# Patient Record
Sex: FEMALE | Race: OTHER | NOT HISPANIC OR LATINO | URBAN - METROPOLITAN AREA
[De-identification: names, ages, dates, MRNs, and addresses within clinical notes are randomized per-mention and may not be internally consistent; named-entity substitution may affect disease eponyms.]

---

## 2022-02-28 ENCOUNTER — TELEPHONE (OUTPATIENT)
Dept: OBSTETRICS AND GYNECOLOGY | Facility: CLINIC | Age: 27
End: 2022-02-28
Payer: COMMERCIAL

## 2022-03-02 ENCOUNTER — OFFICE VISIT (OUTPATIENT)
Dept: OBSTETRICS AND GYNECOLOGY | Facility: CLINIC | Age: 27
End: 2022-03-02
Payer: COMMERCIAL

## 2022-03-02 VITALS
SYSTOLIC BLOOD PRESSURE: 114 MMHG | HEIGHT: 62 IN | DIASTOLIC BLOOD PRESSURE: 62 MMHG | BODY MASS INDEX: 23.13 KG/M2 | WEIGHT: 125.69 LBS

## 2022-03-02 DIAGNOSIS — Z30.9 ENCOUNTER FOR CONTRACEPTIVE MANAGEMENT, UNSPECIFIED TYPE: Primary | ICD-10-CM

## 2022-03-02 LAB
B-HCG UR QL: NEGATIVE
CTP QC/QA: YES

## 2022-03-02 PROCEDURE — 99999 PR PBB SHADOW E&M-EST. PATIENT-LVL II: ICD-10-PCS | Mod: PBBFAC,,, | Performed by: OBSTETRICS & GYNECOLOGY

## 2022-03-02 PROCEDURE — 81025 URINE PREGNANCY TEST: CPT | Mod: S$GLB,,, | Performed by: OBSTETRICS & GYNECOLOGY

## 2022-03-02 PROCEDURE — 99202 PR OFFICE/OUTPT VISIT, NEW, LEVL II, 15-29 MIN: ICD-10-PCS | Mod: S$GLB,,, | Performed by: OBSTETRICS & GYNECOLOGY

## 2022-03-02 PROCEDURE — 81025 POCT URINE PREGNANCY: ICD-10-PCS | Mod: S$GLB,,, | Performed by: OBSTETRICS & GYNECOLOGY

## 2022-03-02 PROCEDURE — 99999 PR PBB SHADOW E&M-EST. PATIENT-LVL II: CPT | Mod: PBBFAC,,, | Performed by: OBSTETRICS & GYNECOLOGY

## 2022-03-02 PROCEDURE — 99202 OFFICE O/P NEW SF 15 MIN: CPT | Mod: S$GLB,,, | Performed by: OBSTETRICS & GYNECOLOGY

## 2022-03-02 NOTE — PROGRESS NOTES
Subjective:       Patient ID: Ifrah Crespo is a 26 y.o. female.    Chief Complaint:  Consult (IUD)    History of Present Illness  HPI  27 y/o  presents for contraception management. She has a history of PCOS (irregular menses and polycystic ovaries). She is sexually active currently. She was on OCPs in the past but had worsening acne and depression. She has some acne. She had the Paragard IUD in 2021 but had pelvic pain and IUD expulsed into the cervix and it was removed in 2021. Currently using condoms for contraception. Cycles are irregular, occurring only every 3 months. LMP 1/10/22.    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     GYN & OB History  Patient's last menstrual period was 01/10/2022.   Date of Last Pap: No result found    OB History    Para Term  AB Living   0 0 0 0 0 0   SAB IAB Ectopic Multiple Live Births   0 0 0 0 0       Review of Systems  Review of Systems   Constitutional: Negative for chills, diaphoresis, fatigue and fever.   Respiratory: Negative for cough and shortness of breath.    Cardiovascular: Negative for chest pain and palpitations.   Gastrointestinal: Negative for abdominal pain, constipation, diarrhea, nausea and vomiting.   Genitourinary: Positive for menstrual problem. Negative for dysmenorrhea, dyspareunia, dysuria, menorrhagia, pelvic pain, vaginal bleeding, vaginal discharge and vaginal pain.   Integumentary:  Positive for acne.   Neurological: Negative for headaches.   Psychiatric/Behavioral: Negative for depression. The patient is not nervous/anxious.            Objective:    Physical Exam:   Constitutional: She is oriented to person, place, and time. She appears well-developed and well-nourished. No distress.    HENT:   Head: Normocephalic and atraumatic.       Pulmonary/Chest: Effort normal.          Genitourinary:    Genitourinary Comments: Talk only             Musculoskeletal: Normal range of motion and moves all extremeties.        Neurological: She is alert and oriented to person, place, and time.     Psychiatric: She has a normal mood and affect. Her behavior is normal. Judgment and thought content normal.          Assessment:        1. Encounter for contraceptive management, unspecified type             Plan:      Ifrah was seen today for consult.    Diagnoses and all orders for this visit:    Encounter for contraceptive management, unspecified type  Contraception options discussed  Darlene pace sent    15 minutes of total time spent on the encounter, which includes face to face time and non-face to face time preparing to see the patient (eg, review of tests), Obtaining and/or reviewing separately obtained history, Documenting clinical information in the electronic or other health record, Independently interpreting results (not separately reported) and communicating results to the patient/family/caregiver, or Care coordination (not separately reported).      Orders Placed This Encounter   Procedures    Device Authorization Order    POCT urine pregnancy       No follow-ups on file.

## 2022-03-21 ENCOUNTER — PROCEDURE VISIT (OUTPATIENT)
Dept: OBSTETRICS AND GYNECOLOGY | Facility: CLINIC | Age: 27
End: 2022-03-21
Payer: COMMERCIAL

## 2022-03-21 VITALS
SYSTOLIC BLOOD PRESSURE: 106 MMHG | DIASTOLIC BLOOD PRESSURE: 60 MMHG | HEIGHT: 62 IN | WEIGHT: 124.75 LBS | BODY MASS INDEX: 22.96 KG/M2

## 2022-03-21 DIAGNOSIS — Z30.430 ENCOUNTER FOR IUD INSERTION: Primary | ICD-10-CM

## 2022-03-21 LAB
B-HCG UR QL: NEGATIVE
CTP QC/QA: YES

## 2022-03-21 PROCEDURE — 58300 INSERT INTRAUTERINE DEVICE: CPT | Mod: S$GLB,,, | Performed by: OBSTETRICS & GYNECOLOGY

## 2022-03-21 PROCEDURE — 99499 NO LOS: ICD-10-PCS | Mod: S$GLB,,, | Performed by: OBSTETRICS & GYNECOLOGY

## 2022-03-21 PROCEDURE — 81025 URINE PREGNANCY TEST: CPT | Mod: S$GLB,,, | Performed by: OBSTETRICS & GYNECOLOGY

## 2022-03-21 PROCEDURE — 99499 UNLISTED E&M SERVICE: CPT | Mod: S$GLB,,, | Performed by: OBSTETRICS & GYNECOLOGY

## 2022-03-21 PROCEDURE — 81025 POCT URINE PREGNANCY: ICD-10-PCS | Mod: S$GLB,,, | Performed by: OBSTETRICS & GYNECOLOGY

## 2022-03-21 PROCEDURE — 58300 INSERTION OF IUD: ICD-10-PCS | Mod: S$GLB,,, | Performed by: OBSTETRICS & GYNECOLOGY

## 2022-03-24 NOTE — PROCEDURES
Insertion of IUD    Date/Time: 3/21/2022 1:30 PM  Performed by: Valeria Colmenares MD  Authorized by: Valeria Colmenares MD     Consent:     Consent obtained:  Written    Consent given by:  Patient    Procedure risks and benefits discussed: yes      Patient questions answered: yes      Patient agrees, verbalizes understanding, and wants to proceed: yes      Educational handouts given: yes      Instructions and paperwork completed: yes    Procedure:     Pelvic exam performed: yes      Negative GC/chlamydia test: no      Negative urine pregnancy test: yes      Negative serum pregnancy test: no      Cervix cleaned and prepped: yes      Speculum placed in vagina: yes      Tenaculum applied to cervix: yes      Uterus sounded: yes      Uterus sound depth (cm):  7    IUD inserted with no complications: yes      IUD type:  ParaGard    Strings trimmed: yes    380 mm copper intrauterine device 380 square mm       Post-procedure:     Patient tolerated procedure well: yes      Patient will follow up after next period: yes

## 2022-04-18 ENCOUNTER — TELEPHONE (OUTPATIENT)
Dept: OBSTETRICS AND GYNECOLOGY | Facility: CLINIC | Age: 27
End: 2022-04-18
Payer: MEDICAID

## 2022-04-18 NOTE — TELEPHONE ENCOUNTER
----- Message from Stephy Pal sent at 4/18/2022  9:47 AM CDT -----  Pt is calling in regards to an appt for iud discomfort  I offered pt next available of 5/2 and pt refused offered [facility] and pt doesn't have transportation  Pt can only go to [facility] location    Pt can be contacted at [phone]    Called pt in regards to message above. Pt states she has been experiencing lower back pain. I offered pt next available. Pt verbalized understanding

## 2022-04-20 ENCOUNTER — OFFICE VISIT (OUTPATIENT)
Dept: OBSTETRICS AND GYNECOLOGY | Facility: CLINIC | Age: 27
End: 2022-04-20
Payer: COMMERCIAL

## 2022-04-20 VITALS
WEIGHT: 126.31 LBS | BODY MASS INDEX: 23.24 KG/M2 | HEIGHT: 62 IN | SYSTOLIC BLOOD PRESSURE: 112 MMHG | DIASTOLIC BLOOD PRESSURE: 64 MMHG

## 2022-04-20 DIAGNOSIS — N89.8 VAGINAL IRRITATION: ICD-10-CM

## 2022-04-20 DIAGNOSIS — R10.2 PELVIC PAIN: ICD-10-CM

## 2022-04-20 DIAGNOSIS — Z30.431 IUD CHECK UP: Primary | ICD-10-CM

## 2022-04-20 LAB
B-HCG UR QL: NEGATIVE
CTP QC/QA: YES

## 2022-04-20 PROCEDURE — 87481 CANDIDA DNA AMP PROBE: CPT | Mod: 59 | Performed by: OBSTETRICS & GYNECOLOGY

## 2022-04-20 PROCEDURE — 99999 PR PBB SHADOW E&M-EST. PATIENT-LVL II: ICD-10-PCS | Mod: PBBFAC,,, | Performed by: OBSTETRICS & GYNECOLOGY

## 2022-04-20 PROCEDURE — 81025 URINE PREGNANCY TEST: CPT | Mod: S$GLB,,, | Performed by: OBSTETRICS & GYNECOLOGY

## 2022-04-20 PROCEDURE — 81025 POCT URINE PREGNANCY: ICD-10-PCS | Mod: S$GLB,,, | Performed by: OBSTETRICS & GYNECOLOGY

## 2022-04-20 PROCEDURE — 99999 PR PBB SHADOW E&M-EST. PATIENT-LVL II: CPT | Mod: PBBFAC,,, | Performed by: OBSTETRICS & GYNECOLOGY

## 2022-04-20 PROCEDURE — 99213 OFFICE O/P EST LOW 20 MIN: CPT | Mod: S$GLB,,, | Performed by: OBSTETRICS & GYNECOLOGY

## 2022-04-20 PROCEDURE — 99213 PR OFFICE/OUTPT VISIT, EST, LEVL III, 20-29 MIN: ICD-10-PCS | Mod: S$GLB,,, | Performed by: OBSTETRICS & GYNECOLOGY

## 2022-04-20 PROCEDURE — 87801 DETECT AGNT MULT DNA AMPLI: CPT | Performed by: OBSTETRICS & GYNECOLOGY

## 2022-04-20 NOTE — PROGRESS NOTES
Subjective:       Patient ID: Ifrah Crespo is a 26 y.o. female.    Chief Complaint:  IUD Check    History of Present Illness  HPI  27 y/o  presents for follow up s/p Paragard IUD placement 4 weeks ago. Reports some pelvic/back pain and vaginal irritation.    GYN & OB History  No LMP recorded (lmp unknown).   Date of Last Pap: No result found    OB History    Para Term  AB Living   0 0 0 0 0 0   SAB IAB Ectopic Multiple Live Births   0 0 0 0 0       Review of Systems  Review of Systems   Constitutional: Negative for chills, diaphoresis, fatigue and fever.   Respiratory: Negative for cough and shortness of breath.    Cardiovascular: Negative for chest pain and palpitations.   Gastrointestinal: Negative for abdominal pain, constipation, diarrhea, nausea and vomiting.   Genitourinary: Positive for pelvic pain and vaginal discharge. Negative for dysmenorrhea, dyspareunia, dysuria, frequency, menstrual problem, vaginal bleeding and vaginal pain.   Neurological: Negative for headaches.   Psychiatric/Behavioral: Negative for depression. The patient is not nervous/anxious.            Objective:    Physical Exam:   Constitutional: She is oriented to person, place, and time. She appears well-developed and well-nourished. No distress.    HENT:   Head: Normocephalic and atraumatic.    Eyes: EOM are normal.      Pulmonary/Chest: Effort normal.          Genitourinary:    Genitourinary Comments: Normal external female genitalia; vagina rugated, normal; cervix normal, no masses, IUD strings in place.              Musculoskeletal: Normal range of motion and moves all extremeties.       Neurological: She is alert and oriented to person, place, and time.    Skin: No rash noted.    Psychiatric: She has a normal mood and affect. Her behavior is normal. Judgment and thought content normal.          Assessment:        1. IUD check up    2. Pelvic pain    3. Vaginal irritation             Plan:      Ifrah was seen  today for iud check.    Diagnoses and all orders for this visit:    IUD check up    Pelvic pain  -     POCT urine pregnancy    Vaginal irritation  -     Vaginosis Screen by DNA Probe    IUD strings in place  UPT neg  Affirm collected  Precautions    Orders Placed This Encounter   Procedures    Vaginosis Screen by DNA Probe    POCT urine pregnancy       Follow up if symptoms worsen or fail to improve.

## 2022-04-22 LAB
BACTERIAL VAGINOSIS DNA: NEGATIVE
CANDIDA GLABRATA DNA: NEGATIVE
CANDIDA KRUSEI DNA: NEGATIVE
CANDIDA RRNA VAG QL PROBE: NEGATIVE
T VAGINALIS RRNA GENITAL QL PROBE: NEGATIVE

## 2022-04-27 ENCOUNTER — HOSPITAL ENCOUNTER (OUTPATIENT)
Dept: RADIOLOGY | Facility: OTHER | Age: 27
Discharge: HOME | End: 2022-04-27
Attending: OBSTETRICS & GYNECOLOGY
Payer: COMMERCIAL

## 2022-04-27 ENCOUNTER — TELEPHONE (OUTPATIENT)
Dept: OBSTETRICS AND GYNECOLOGY | Facility: CLINIC | Age: 27
End: 2022-04-27
Payer: MEDICAID

## 2022-04-27 DIAGNOSIS — R10.2 PELVIC PAIN: ICD-10-CM

## 2022-04-27 DIAGNOSIS — Z30.431 IUD CHECK UP: ICD-10-CM

## 2022-04-27 DIAGNOSIS — R10.2 PELVIC PAIN: Primary | ICD-10-CM

## 2022-04-27 PROCEDURE — 76856 US EXAM PELVIC COMPLETE: CPT | Mod: 26,,, | Performed by: RADIOLOGY

## 2022-04-27 PROCEDURE — 76830 TRANSVAGINAL US NON-OB: CPT | Mod: TC

## 2022-04-27 PROCEDURE — 76830 TRANSVAGINAL US NON-OB: CPT | Mod: 26,,, | Performed by: RADIOLOGY

## 2022-04-27 PROCEDURE — 76856 US PELVIS COMP WITH TRANSVAG NON-OB (XPD): ICD-10-PCS | Mod: 26,,, | Performed by: RADIOLOGY

## 2022-04-27 PROCEDURE — 76830 US PELVIS COMP WITH TRANSVAG NON-OB (XPD): ICD-10-PCS | Mod: 26,,, | Performed by: RADIOLOGY

## 2022-04-27 NOTE — TELEPHONE ENCOUNTER
----- Message from Dax Vivar MA sent at 4/27/2022 10:20 AM CDT -----  Can you put in order in for an ultrasound. Pt states her pain has worsened  over the last two days. She was just seen on 04/20 for an iud check.  ----- Message -----  From: Stephy Pal  Sent: 4/27/2022  10:05 AM CDT  To: Darrian [facility] Staff    Pt is calling to speak with provider for results from labs and also to talk about the pain she is having.  Pt believes pain is urgent and would like to discuss her concerns with provider  I did schedule the soonest available appt for provider this Friday   Pt can be contacted at [phone]

## 2022-04-27 NOTE — TELEPHONE ENCOUNTER
----- Message from Stephy Pal sent at 4/27/2022 10:03 AM CDT -----  Pt is calling to speak with provider for results from labs and also to talk about the pain she is having.  Pt believes pain is urgent and would like to discuss her concerns with provider  I did schedule the soonest available appt for provider this Friday   Pt can be contacted at [phone]    Called pt in regards to message above. I told pt I will talk to Dr. Colmenares to see if she can order an ultrasound. Pt verbalized understanding

## 2022-04-29 ENCOUNTER — PATIENT MESSAGE (OUTPATIENT)
Dept: OBSTETRICS AND GYNECOLOGY | Facility: CLINIC | Age: 27
End: 2022-04-29
Payer: MEDICAID

## 2022-05-25 ENCOUNTER — OFFICE VISIT (OUTPATIENT)
Dept: OBSTETRICS AND GYNECOLOGY | Facility: CLINIC | Age: 27
End: 2022-05-25
Payer: COMMERCIAL

## 2022-05-25 VITALS
SYSTOLIC BLOOD PRESSURE: 108 MMHG | HEIGHT: 62 IN | WEIGHT: 124.31 LBS | DIASTOLIC BLOOD PRESSURE: 54 MMHG | BODY MASS INDEX: 22.88 KG/M2

## 2022-05-25 DIAGNOSIS — Z97.5 IUD CONTRACEPTION: Primary | ICD-10-CM

## 2022-05-25 PROCEDURE — 99213 OFFICE O/P EST LOW 20 MIN: CPT | Mod: PBBFAC | Performed by: OBSTETRICS & GYNECOLOGY

## 2022-05-25 PROCEDURE — 99999 PR PBB SHADOW E&M-EST. PATIENT-LVL III: CPT | Mod: PBBFAC,,, | Performed by: OBSTETRICS & GYNECOLOGY

## 2022-05-25 PROCEDURE — 99213 OFFICE O/P EST LOW 20 MIN: CPT | Mod: S$GLB,,, | Performed by: OBSTETRICS & GYNECOLOGY

## 2022-05-25 PROCEDURE — 99213 PR OFFICE/OUTPT VISIT, EST, LEVL III, 20-29 MIN: ICD-10-PCS | Mod: S$GLB,,, | Performed by: OBSTETRICS & GYNECOLOGY

## 2022-05-25 PROCEDURE — 99999 PR PBB SHADOW E&M-EST. PATIENT-LVL III: ICD-10-PCS | Mod: PBBFAC,,, | Performed by: OBSTETRICS & GYNECOLOGY

## 2022-05-25 RX ORDER — LACTIC ACID, L-, CITRIC ACID MONOHYDRATE, AND POTASSIUM BITARTRATE 90; 50; 20 MG/5G; MG/5G; MG/5G
1 GEL VAGINAL
Qty: 180 G | Refills: 11 | Status: SHIPPED | OUTPATIENT
Start: 2022-05-25 | End: 2022-06-07 | Stop reason: SDUPTHER

## 2022-05-25 NOTE — PROGRESS NOTES
Subjective:       Patient ID: Ifrah Crespo is a 27 y.o. female.    Chief Complaint:  IUD Check      History of Present Illness  HPI  26 y/o  presents for contraception management. She had Paragard IUD placed 2 months ago. She had some pelvic pain and so pelvic US was done which showed that the IUD was slightly low-lying. Reports pain now resolved.     GYN & OB History  No LMP recorded. Patient has had an implant.   Date of Last Pap: No result found    OB History    Para Term  AB Living   0 0 0 0 0 0   SAB IAB Ectopic Multiple Live Births   0 0 0 0 0       Review of Systems  Review of Systems   Constitutional: Negative for chills, diaphoresis, fatigue and fever.   Respiratory: Negative for cough and shortness of breath.    Cardiovascular: Negative for chest pain and palpitations.   Gastrointestinal: Negative for abdominal pain, constipation, diarrhea, nausea and vomiting.   Genitourinary: Negative for dysmenorrhea, dyspareunia, dysuria, frequency, menorrhagia, menstrual problem, pelvic pain, vaginal bleeding, vaginal discharge and vaginal pain.   Neurological: Negative for headaches.   Psychiatric/Behavioral: Negative for depression. The patient is not nervous/anxious.            Objective:    Physical Exam:   Constitutional: She is oriented to person, place, and time. She appears well-developed and well-nourished. No distress.    HENT:   Head: Normocephalic and atraumatic.    Eyes: EOM are normal.      Pulmonary/Chest: Effort normal.        Abdominal: Soft. She exhibits no distension and no mass. There is no abdominal tenderness. There is no rebound. No hernia.     Genitourinary:    Genitourinary Comments: Normal external female genitalia; vagina rugated, normal; cervix normal, no masses, IUD strings in place.              Musculoskeletal: Normal range of motion and moves all extremeties.       Neurological: She is alert and oriented to person, place, and time.     Psychiatric: She has a  normal mood and affect. Her behavior is normal. Judgment and thought content normal.          Assessment:        1. IUD contraception             Plan:      Ifrah was seen today for iud check.    Diagnoses and all orders for this visit:    IUD contraception    Other orders  -     lactic acid-citric-potassium (PHEXXI) 1.8-1-0.4 % Gel; Place 1 applicator vaginally as needed (contraception).    Counseled patient that given slightly low lying IUD, cannot say how effective the IUD is as far as contraception. Counseled on increased risk of abnormal pregnancy if she were to conceive with IUD in place. Ultimately the IUD in this position is still likely better than no conctraception, and we discussed that it would be reasonable to use a backup contraception like condoms/phexxi and leave the IUD in place.  Rx Phexxi sent.  Precautions.     20 minutes of total time spent on the encounter, which includes face to face time and non-face to face time preparing to see the patient (eg, review of tests), Obtaining and/or reviewing separately obtained history, Documenting clinical information in the electronic or other health record, Independently interpreting results (not separately reported) and communicating results to the patient/family/caregiver, or Care coordination (not separately reported).      No orders of the defined types were placed in this encounter.      No follow-ups on file.

## 2022-05-25 NOTE — PROGRESS NOTES
Subjective:       Patient ID: Ifrah Crespo is a 26 y.o. female.    Chief Complaint:  IUD Check      History of Present Illness  HPI  {OBG HPI BLOCKS:41101}   Denies pelvic pain and vaginal discharge.       GYN & OB History  No LMP recorded. Patient has had an implant.   Date of Last Pap: No result found    OB History    Para Term  AB Living   0 0 0 0 0 0   SAB IAB Ectopic Multiple Live Births   0 0 0 0 0       Review of Systems  Review of Systems        Objective:    Physical Exam       Assessment:      No diagnosis found.   ***         Plan:      ***

## 2022-06-07 ENCOUNTER — PATIENT MESSAGE (OUTPATIENT)
Dept: OBSTETRICS AND GYNECOLOGY | Facility: CLINIC | Age: 27
End: 2022-06-07
Payer: MEDICAID

## 2022-06-07 RX ORDER — LACTIC ACID, L-, CITRIC ACID MONOHYDRATE, AND POTASSIUM BITARTRATE 90; 50; 20 MG/5G; MG/5G; MG/5G
1 GEL VAGINAL
Qty: 120 G | Refills: 11 | Status: SHIPPED | OUTPATIENT
Start: 2022-06-07

## 2022-06-27 ENCOUNTER — OFFICE VISIT (OUTPATIENT)
Dept: OBSTETRICS AND GYNECOLOGY | Facility: CLINIC | Age: 27
End: 2022-06-27
Payer: MEDICAID

## 2022-06-27 ENCOUNTER — DOCUMENTATION ONLY (OUTPATIENT)
Dept: PHARMACY | Facility: CLINIC | Age: 27
End: 2022-06-27
Payer: MEDICAID

## 2022-06-27 VITALS
BODY MASS INDEX: 22.71 KG/M2 | DIASTOLIC BLOOD PRESSURE: 68 MMHG | SYSTOLIC BLOOD PRESSURE: 110 MMHG | WEIGHT: 123.44 LBS | HEIGHT: 62 IN

## 2022-06-27 DIAGNOSIS — Z30.09 ENCOUNTER FOR COUNSELING REGARDING CONTRACEPTION: Primary | ICD-10-CM

## 2022-06-27 LAB
B-HCG UR QL: NEGATIVE
CTP QC/QA: YES

## 2022-06-27 PROCEDURE — 81025 URINE PREGNANCY TEST: CPT | Mod: PBBFAC | Performed by: OBSTETRICS & GYNECOLOGY

## 2022-06-27 PROCEDURE — 99999 PR PBB SHADOW E&M-EST. PATIENT-LVL III: CPT | Mod: PBBFAC,,, | Performed by: OBSTETRICS & GYNECOLOGY

## 2022-06-27 PROCEDURE — 58301 REMOVE INTRAUTERINE DEVICE: CPT | Mod: PBBFAC | Performed by: OBSTETRICS & GYNECOLOGY

## 2022-06-27 PROCEDURE — 99213 OFFICE O/P EST LOW 20 MIN: CPT | Mod: PBBFAC,25 | Performed by: OBSTETRICS & GYNECOLOGY

## 2022-06-27 PROCEDURE — 99212 PR OFFICE/OUTPT VISIT, EST, LEVL II, 10-19 MIN: ICD-10-PCS | Mod: 25,S$PBB,, | Performed by: OBSTETRICS & GYNECOLOGY

## 2022-06-27 PROCEDURE — 58301 REMOVAL OF IUD: ICD-10-PCS | Mod: S$PBB,,, | Performed by: OBSTETRICS & GYNECOLOGY

## 2022-06-27 PROCEDURE — 99999 PR PBB SHADOW E&M-EST. PATIENT-LVL III: ICD-10-PCS | Mod: PBBFAC,,, | Performed by: OBSTETRICS & GYNECOLOGY

## 2022-06-27 PROCEDURE — 99212 OFFICE O/P EST SF 10 MIN: CPT | Mod: 25,S$PBB,, | Performed by: OBSTETRICS & GYNECOLOGY

## 2022-06-27 RX ORDER — DROSPIRENONE AND ETHINYL ESTRADIOL 0.02-3(28)
1 KIT ORAL DAILY
Qty: 84 TABLET | Refills: 3 | Status: SHIPPED | OUTPATIENT
Start: 2022-06-27 | End: 2023-06-27

## 2022-06-27 RX ORDER — MEDROXYPROGESTERONE ACETATE 150 MG/ML
150 INJECTION, SUSPENSION INTRAMUSCULAR ONCE
Qty: 1 ML | Refills: 0 | Status: SHIPPED | OUTPATIENT
Start: 2022-06-27 | End: 2022-06-28

## 2022-06-27 NOTE — PROGRESS NOTES
Subjective:       Patient ID: Ifrah Crespo is a 27 y.o. female.    Chief Complaint:  Contraception      History of Present Illness  HPI  28 y/o  presents for contraception management. She has Paragard IUD in place but on recent US it was noted to be slightly low lying. She had pain with intercourse recently. Partner could feel the strings as well. She is traveling for the next month. She has had acne develop with OCPs in the past.    GYN & OB History  No LMP recorded (lmp unknown). Patient has had an implant.   Date of Last Pap: No result found    OB History    Para Term  AB Living   0 0 0 0 0 0   SAB IAB Ectopic Multiple Live Births   0 0 0 0 0       Review of Systems  Review of Systems   Constitutional: Negative for chills, diaphoresis, fatigue and fever.   Respiratory: Negative for cough and shortness of breath.    Cardiovascular: Negative for chest pain and palpitations.   Gastrointestinal: Negative for abdominal pain, constipation, diarrhea, nausea and vomiting.   Genitourinary: Positive for dyspareunia, menstrual problem and pelvic pain. Negative for dysmenorrhea, vaginal bleeding, vaginal discharge and vaginal pain.   Neurological: Negative for headaches.   Psychiatric/Behavioral: Negative for depression. The patient is not nervous/anxious.            Objective:    Physical Exam:   Constitutional: She is oriented to person, place, and time. She appears well-developed and well-nourished. No distress.    HENT:   Head: Normocephalic and atraumatic.    Eyes: EOM are normal.      Pulmonary/Chest: Effort normal.          Genitourinary:    Genitourinary Comments: Normal external female genitalia; vagina rugated, normal; cervix normal, no masses, IUD strings visible at os.             Musculoskeletal: Normal range of motion and moves all extremeties.       Neurological: She is alert and oriented to person, place, and time.    Skin: No rash noted.    Psychiatric: She has a normal mood and  affect. Her behavior is normal. Judgment and thought content normal.          Assessment:        1. Encounter for counseling regarding contraception             Plan:      Ifrah was seen today for contraception.    Diagnoses and all orders for this visit:    Encounter for counseling regarding contraception  -     medroxyPROGESTERone (DEPO-PROVERA) 150 mg/mL Syrg; Inject 1 mL (150 mg total) into the muscle once. for 1 dose  -     drospirenone-ethinyl estradioL (MORENO) 3-0.02 mg per tablet; Take 1 tablet by mouth once daily.  -     POCT urine pregnancy    IUD removed, was located in lower uterine segment.  Contraception options discussed including OCPs, Depo Provera, vaginal ring, hormone patch, IUD, Nexplanon.   UPT neg.   Depo Provera today, patient will consider other long-term options.   Rx OCPs for breakthrough bleeding.    Orders Placed This Encounter   Procedures    POCT urine pregnancy       No follow-ups on file.

## 2022-06-27 NOTE — PROGRESS NOTES
Patient received IM Depo Provera to the upper outer side of right buttock on 06/27/2022  The patient was instructed to get the next injection between 09/12-09/26/22     Lot Number: [id number]  Expiration Date: 03/24  BY NT

## 2022-06-28 NOTE — PROCEDURES
Removal of IUD    Date/Time: 6/27/2022 10:00 AM  Performed by: Valeria Colmenares MD  Authorized by: Valeria Colmenares MD     Consent obtained:  Written  Consent given by:  Patient  Procedure risks and benefits discussed: yes    Patient questions answered: yes    Patient agrees, verbalizes understanding, and wants to proceed: yes    Educational handouts given: yes    IUD grasped by: ring forceps  Removal due to infection and inflammatory reaction: no    Other reason for removal:  Low lying IUD (lower uterine segment)  Removal due to mechanical complications of IUD/Nexplanon: no    Removed with no complications: yes

## 2022-09-12 ENCOUNTER — PATIENT MESSAGE (OUTPATIENT)
Dept: OBSTETRICS AND GYNECOLOGY | Facility: CLINIC | Age: 27
End: 2022-09-12
Payer: MEDICAID

## 2022-09-27 ENCOUNTER — TELEPHONE (OUTPATIENT)
Dept: OBSTETRICS AND GYNECOLOGY | Facility: CLINIC | Age: 27
End: 2022-09-27
Payer: MEDICAID

## 2022-09-27 NOTE — TELEPHONE ENCOUNTER
----- Message from Margarita Pelletier sent at 9/27/2022  2:26 PM CDT -----  Regarding: concerns  Name of Who is Calling: Crystal           What is the request in detail: Patient is requesting a call back in regards a bill that she received for her IUD. She was told that it was covered under her [employer] Plan she had at the time and her insurance company stated that they only pay a percentage and she was out of network.           Can the clinic reply by [facility]: No           [facility]: [phone]